# Patient Record
Sex: FEMALE | Race: WHITE
[De-identification: names, ages, dates, MRNs, and addresses within clinical notes are randomized per-mention and may not be internally consistent; named-entity substitution may affect disease eponyms.]

---

## 2019-12-06 ENCOUNTER — HOSPITAL ENCOUNTER (EMERGENCY)
Dept: HOSPITAL 96 - M.ERS | Age: 19
Discharge: HOME | End: 2019-12-06
Payer: OTHER GOVERNMENT

## 2019-12-06 VITALS — WEIGHT: 217.99 LBS | HEIGHT: 60 IN | BODY MASS INDEX: 42.8 KG/M2

## 2019-12-06 VITALS — DIASTOLIC BLOOD PRESSURE: 66 MMHG | SYSTOLIC BLOOD PRESSURE: 134 MMHG

## 2019-12-06 DIAGNOSIS — Z98.890: ICD-10-CM

## 2019-12-06 DIAGNOSIS — J02.9: Primary | ICD-10-CM

## 2019-12-06 DIAGNOSIS — Z88.8: ICD-10-CM

## 2020-02-13 LAB
ADD MANUAL DIFF: NO
APPEARANCE UR: (no result)
APTT PPP: YELLOW S
BARBITURATES UR QL SCN: NEGATIVE
BASOPHILS # BLD AUTO: 0 10^3/UL (ref 0–0.2)
BASOPHILS NFR BLD AUTO: 0.2 % (ref 0–2)
BILIRUB UR QL STRIP: NEGATIVE
EOSINOPHIL # BLD AUTO: 0.1 10^3/UL (ref 0–0.6)
EOSINOPHIL NFR BLD AUTO: 0.9 % (ref 0–6)
ERYTHROCYTE [DISTWIDTH] IN BLOOD BY AUTOMATED COUNT: 15.5 % (ref 11.5–14)
GLUCOSE UR STRIP-MCNC: NEGATIVE MG/DL
HCT VFR BLD CALC: 31.4 % (ref 36–47)
HGB BLD-MCNC: 10.1 G/DL (ref 12–15.5)
KETONES UR STRIP-MCNC: 20 MG/DL
LYMPHOCYTES # BLD AUTO: 2.1 10^3/UL (ref 0.5–4.7)
LYMPHOCYTES NFR BLD AUTO: 19.2 % (ref 13–45)
MCH RBC QN AUTO: 22.7 PG (ref 27–33.4)
MCHC RBC AUTO-ENTMCNC: 32.2 G/DL (ref 32–36)
MCV RBC AUTO: 70 FL (ref 80–97)
METHADONE UR QL SCN: NEGATIVE
MONOCYTES # BLD AUTO: 0.9 10^3/UL (ref 0.1–1.4)
MONOCYTES NFR BLD AUTO: 7.9 % (ref 3–13)
NEUTROPHILS # BLD AUTO: 7.9 10^3/UL (ref 1.7–8.2)
NEUTS SEG NFR BLD AUTO: 71.8 % (ref 42–78)
NITRITE UR QL STRIP: NEGATIVE
PCP UR QL SCN: NEGATIVE
PH UR STRIP: 7 [PH] (ref 5–9)
PLATELET # BLD: 169 10^3/UL (ref 150–450)
PROT UR STRIP-MCNC: NEGATIVE MG/DL
RBC # BLD AUTO: 4.46 10^6/UL (ref 3.72–5.28)
SP GR UR STRIP: 1.02
TOTAL CELLS COUNTED % (AUTO): 100 %
URINE AMPHETAMINES SCREEN: NEGATIVE
URINE BENZODIAZEPINES SCREEN: NEGATIVE
URINE COCAINE SCREEN: NEGATIVE
URINE MARIJUANA (THC) SCREEN: NEGATIVE
UROBILINOGEN UR-MCNC: NEGATIVE MG/DL (ref ?–2)
WBC # BLD AUTO: 11.1 10^3/UL (ref 4–10.5)

## 2020-02-17 ENCOUNTER — HOSPITAL ENCOUNTER (INPATIENT)
Dept: HOSPITAL 62 - 2S | Age: 20
LOS: 3 days | Discharge: HOME | End: 2020-02-20
Attending: OBSTETRICS & GYNECOLOGY | Admitting: OBSTETRICS & GYNECOLOGY
Payer: OTHER GOVERNMENT

## 2020-02-17 DIAGNOSIS — Z23: ICD-10-CM

## 2020-02-17 DIAGNOSIS — G43.909: ICD-10-CM

## 2020-02-17 DIAGNOSIS — O34.219: Primary | ICD-10-CM

## 2020-02-17 DIAGNOSIS — O34.211: ICD-10-CM

## 2020-02-17 DIAGNOSIS — Z3A.37: ICD-10-CM

## 2020-02-17 DIAGNOSIS — D62: ICD-10-CM

## 2020-02-17 PROCEDURE — 86900 BLOOD TYPING SEROLOGIC ABO: CPT

## 2020-02-17 PROCEDURE — 94760 N-INVAS EAR/PLS OXIMETRY 1: CPT

## 2020-02-17 PROCEDURE — 94799 UNLISTED PULMONARY SVC/PX: CPT

## 2020-02-17 PROCEDURE — 90686 IIV4 VACC NO PRSV 0.5 ML IM: CPT

## 2020-02-17 PROCEDURE — 85027 COMPLETE CBC AUTOMATED: CPT

## 2020-02-17 PROCEDURE — 86850 RBC ANTIBODY SCREEN: CPT

## 2020-02-17 PROCEDURE — 85025 COMPLETE CBC W/AUTO DIFF WBC: CPT

## 2020-02-17 PROCEDURE — 59025 FETAL NON-STRESS TEST: CPT

## 2020-02-17 PROCEDURE — 86901 BLOOD TYPING SEROLOGIC RH(D): CPT

## 2020-02-17 PROCEDURE — 3E0234Z INTRODUCTION OF SERUM, TOXOID AND VACCINE INTO MUSCLE, PERCUTANEOUS APPROACH: ICD-10-PCS | Performed by: OBSTETRICS & GYNECOLOGY

## 2020-02-17 PROCEDURE — 36415 COLL VENOUS BLD VENIPUNCTURE: CPT

## 2020-02-17 PROCEDURE — 80307 DRUG TEST PRSMV CHEM ANLYZR: CPT

## 2020-02-17 PROCEDURE — 81001 URINALYSIS AUTO W/SCOPE: CPT

## 2020-02-17 RX ADMIN — OXYCODONE AND ACETAMINOPHEN PRN TAB: 5; 325 TABLET ORAL at 22:56

## 2020-02-17 RX ADMIN — KETOROLAC TROMETHAMINE SCH MG: 30 INJECTION, SOLUTION INTRAMUSCULAR at 17:45

## 2020-02-17 RX ADMIN — DOCUSATE SODIUM SCH MG: 100 CAPSULE, LIQUID FILLED ORAL at 17:45

## 2020-02-17 RX ADMIN — Medication SCH CAP: at 12:37

## 2020-02-17 RX ADMIN — OXYCODONE AND ACETAMINOPHEN PRN TAB: 5; 325 TABLET ORAL at 12:53

## 2020-02-17 RX ADMIN — DOCUSATE SODIUM SCH MG: 100 CAPSULE, LIQUID FILLED ORAL at 12:37

## 2020-02-17 RX ADMIN — DIVALPROEX SODIUM SCH MG: 500 TABLET, FILM COATED, EXTENDED RELEASE ORAL at 22:35

## 2020-02-17 NOTE — PDOC DELIVERY SUMMARY
Delivery Summary





- Maternal


Hx : II


Hx # Term Pregnancies: 1


JONATHAN: 20


Prenatal Risk Factors: Other


Ruptured Membranes: AROM


Fluids: Clear





- Delivery


Labor: Not In Labor


Presentation: Vertex


Fetal Heart Rate Monitoring: Done Pre-Operatively


Support Person Present: Yes


Location: OR


: Scheduled


Placenta: Within Normal Limits


Number of Vessels (Cord): 3


Delivery of Placenta Date: 20


Estimated Blood Loss: 900





- Medications


Type of Anesthesia:: Spinal

## 2020-02-17 NOTE — OPERATIVE REPORT
Operative Report


DATE OF SURGERY: 20


PREOPERATIVE DIAGNOSIS: IUP at term and IUGR


POSTOPERATIVE DIAGNOSIS: Same


OPERATION: Repeat low transverse  section delivery of viable infant


SURGEON: SAMSON LEAL


TISSUE REMOVED OR ALTERED: Placenta


ESTIMATED BLOOD LOSS: 900 cc


PROCEDURE: 





 The patient was taken to the operating room where spinal anesthesia was obtai

laz and found to be adequate.  She was then prepped and draped in the normal 

sterile fashion and placed in the dorsal supine position with a leftward tilt.  

A Pfannenstiel skin incision was then made and carried through to the underlying

layers of the fascia with the scalpel.  The fascia was incised in the midline 

and the incision extended laterally with the Rodriguez scissors.  The superior aspect

of the fascial incision was then grasped with Shant clamps elevated and the 

underlying rectus muscles dissected off bluntly.  Attention was then turned to 

the inferior aspect of the fascial incision which in a similar fashion was 

grasped, tented up with Kocher clamps, and the rectus muscles dissected off 

bluntly.  The rectus muscles were then  in the midline and the 

peritoneum at the amount identified and entered bluntly.  The peritoneal 

incision was then extended superiorly and inferiorly with good visualization of 

the bladder.  [The bladder blade was inserted and the vesicouterine peritoneum 

identified grasped with [place] pickups and entered sharply with the Metzenbaum 

scissors.  THE incision was then extended laterally with the Metzenbaum scissors

 and a bladder flap created digitally.  The bladder blade was then reinserted 

and the lower uterine segment incised in a transverse fashion with the scalpel. 

 The uterine incision was then extended bluntly.  The bladder blade was removed 

and the infant's head was delivered from cephalic presentation atraumatically.  

The nose and mouth were suctioned and the cord doubly clamped and cut.  And the 

infant was handed off to waiting pediatricians.


 


The placenta was then delivered manully and the uterus exteriorized and cleared 

of all clots and debris.  The uterine incision was then repaired with 1-0 Vicryl

 in a running locked fashion.  A second layer of the same suture was used to 

obtain hemostasis via imbrication of the initial layer.  Multiple 1-0 Vicryl 

sutures were used to control the bleeding and hemostasis was noted.  The uterus 

was returned to the patient's abdomen.  The gutters were cleared of all clots 

and debris.  All operative sites were noted to be hemostatic.  The fascia was 

reapproximated with 0 Vicryl in a running fashion from each lateral edge to the 

midline.  The patient tolerated the procedure well.  Sponge lap needle and 

instrument counts are correct -2. 2 g of Ancef were given prior to skin incisio

n.  The patient was taken to the recovery area awake and in stable condition.

## 2020-02-18 LAB
ERYTHROCYTE [DISTWIDTH] IN BLOOD BY AUTOMATED COUNT: 16 % (ref 11.5–14)
HCT VFR BLD CALC: 25.3 % (ref 36–47)
HGB BLD-MCNC: 8.3 G/DL (ref 12–15.5)
MCH RBC QN AUTO: 23.7 PG (ref 27–33.4)
MCHC RBC AUTO-ENTMCNC: 32.8 G/DL (ref 32–36)
MCV RBC AUTO: 72 FL (ref 80–97)
PLATELET # BLD: 141 10^3/UL (ref 150–450)
RBC # BLD AUTO: 3.5 10^6/UL (ref 3.72–5.28)
WBC # BLD AUTO: 11 10^3/UL (ref 4–10.5)

## 2020-02-18 RX ADMIN — KETOROLAC TROMETHAMINE SCH MG: 30 INJECTION, SOLUTION INTRAMUSCULAR at 00:19

## 2020-02-18 RX ADMIN — DOCUSATE SODIUM SCH MG: 100 CAPSULE, LIQUID FILLED ORAL at 09:03

## 2020-02-18 RX ADMIN — DIVALPROEX SODIUM SCH MG: 500 TABLET, FILM COATED, EXTENDED RELEASE ORAL at 09:02

## 2020-02-18 RX ADMIN — DIVALPROEX SODIUM SCH MG: 500 TABLET, FILM COATED, EXTENDED RELEASE ORAL at 21:17

## 2020-02-18 RX ADMIN — IBUPROFEN SCH MG: 800 TABLET, FILM COATED ORAL at 14:10

## 2020-02-18 RX ADMIN — IBUPROFEN SCH MG: 800 TABLET, FILM COATED ORAL at 09:03

## 2020-02-18 RX ADMIN — DOCUSATE SODIUM SCH MG: 100 CAPSULE, LIQUID FILLED ORAL at 18:07

## 2020-02-18 RX ADMIN — OXYCODONE AND ACETAMINOPHEN PRN TAB: 5; 325 TABLET ORAL at 09:20

## 2020-02-18 RX ADMIN — Medication SCH CAP: at 09:03

## 2020-02-18 RX ADMIN — IBUPROFEN SCH MG: 800 TABLET, FILM COATED ORAL at 20:27

## 2020-02-18 NOTE — PDOC PROGRESS REPORT
Subjective-OB


Progress Note for:: 20


Subjective: 


Pt doing well, has been out of bed to void without difficulty. She reports good 

pain control, reg diet and light bleeding.  





Physical Exam (OB)


Vital Signs: 


                                        











Temp Pulse Resp BP Pulse Ox


 


 97.8 F   75   16   104/53 L  99 


 


 20 07:42  20 07:42  20 07:42  20 07:42  20 07:42








                                 Intake & Output











 20





 06:59 06:59 06:59


 


Intake Total  880 400


 


Output Total  1950 


 


Balance  -1070 400


 


Weight 102.1 kg  














- PIH/Pre-Eclampsia


Headache: Absent





- 


Dressing Removed: No


Incision: Open


Closure Type: Sutures





- Lochia


Lochia Amount: Small 10-25 ml


Lochia Color: Rubra/Red





- Abdomen


Description: Soft, Round


Hernia Present: No


Fundal Description: Firm, Midline


Fundal Height: u/u - u/2





Objective-Diagnostic


Laboratory: 


                                        





                                 20 06:07 





                                        











  20





  06:07


 


WBC  11.0 H


 


RBC  3.50 L


 


Hgb  8.3 L


 


Hct  25.3 L


 


MCV  72 L


 


MCH  23.7 L


 


MCHC  32.8


 


RDW  16.0 H


 


Plt Count  141 L














Assessment and Plan(PN)





- Assessment and Plan


(1) Status post repeat low transverse  section


Is this a current diagnosis for this admission?: Yes   





- Time Spent with Patient


Time with patient: Less than 15 minutes


Medications reviewed and adjusted accordingly: Yes





- Disposition


Anticipated Discharge: Home


Within: within 24 hours

## 2020-02-19 RX ADMIN — Medication SCH CAP: at 09:19

## 2020-02-19 RX ADMIN — DIVALPROEX SODIUM SCH MG: 500 TABLET, FILM COATED, EXTENDED RELEASE ORAL at 21:34

## 2020-02-19 RX ADMIN — DIVALPROEX SODIUM SCH MG: 500 TABLET, FILM COATED, EXTENDED RELEASE ORAL at 09:19

## 2020-02-19 RX ADMIN — IBUPROFEN SCH MG: 800 TABLET, FILM COATED ORAL at 14:07

## 2020-02-19 RX ADMIN — IBUPROFEN SCH MG: 800 TABLET, FILM COATED ORAL at 02:56

## 2020-02-19 RX ADMIN — IBUPROFEN SCH MG: 800 TABLET, FILM COATED ORAL at 21:33

## 2020-02-19 RX ADMIN — IBUPROFEN SCH MG: 800 TABLET, FILM COATED ORAL at 09:19

## 2020-02-19 RX ADMIN — DOCUSATE SODIUM SCH MG: 100 CAPSULE, LIQUID FILLED ORAL at 09:19

## 2020-02-19 RX ADMIN — DOCUSATE SODIUM SCH MG: 100 CAPSULE, LIQUID FILLED ORAL at 17:34

## 2020-02-19 RX ADMIN — OXYCODONE AND ACETAMINOPHEN PRN TAB: 5; 325 TABLET ORAL at 04:10

## 2020-02-19 NOTE — PDOC PROGRESS REPORT
Subjective-OB


Progress Note for:: 20


Subjective: 


18yo G2 now P2 s/p repeat . Ambulating, voiding and passing gas without 

difficulty. Pain well tolerated with medication. Having some difficulty with 

breastfeeding but supplementing via SNS system. Baby not being discharged due to

weight loss, requesting to stay another day. No other concerns. 





Physical Exam (OB)


Vital Signs: 


                                        











Temp Pulse Resp BP Pulse Ox


 


 98.0 F   87   18   106/58 L  99 


 


 20 07:16  20 07:16  20 07:16  20 07:16  20 07:16








                                 Intake & Output











 20





 06:59 06:59 06:59


 


Intake Total 880 1580 


 


Output Total 1950  


 


Balance -1070 1580 














- General


General Appearance: Appears well


In distress: None





- PIH/Pre-Eclampsia


DTR's: 1 +


Clonus: Negative


Headache: Absent


Epigastric Pain: No


Visual Changes: No





- 


Dressing Removed: Yes


Incision: Well Approximated


Closure Type: internal





- Lochia


Lochia Amount: Scant < 10 ml


Lochia Color: Rubra/Red





- Abdomen


Description: Tender, Soft


Hernia Present: No


Fundal Description: Firm


Fundal Height: u/u - u/2





- Respiratory


Respiratory Status: No respiratory distress





- Extremities


Upper extremity: Normal inspection


Lower extremities: Normal inspection





- Neurological


Cognition: Normal


Orientation: AAOx4





- Psychological


Associated symptoms: Normal affect, Normal mood





Objective-Diagnostic


Laboratory: 


                                        





                                 20 06:07 











Assessment and Plan(PN)





- Assessment and Plan


(1) Acute blood loss anemia


Is this a current diagnosis for this admission?: Yes   


Plan: 


increase dietary iron and FeSO4 bid. Had started supplementation one week prior 

to delivery. 








(2) Anemia affecting pregnancy


Qualifiers: 


   Trimester: third trimester   Qualified Code(s): O99.013 - Anemia complicating

pregnancy, third trimester   


Is this a current diagnosis for this admission?: Yes   


Plan: 


 Had started supplementation one week prior to delivery, continue FeSO4 BID








(3) Status post repeat low transverse  section


Is this a current diagnosis for this admission?: Yes   


Plan: 


routine pp care, anticipate discharge tomorrow








- Time Spent with Patient


Time with patient: Less than 15 minutes


Medications reviewed and adjusted accordingly: Yes





- Disposition


Anticipated Discharge: Home


Within: within 24 hours

## 2020-02-20 VITALS — DIASTOLIC BLOOD PRESSURE: 49 MMHG | SYSTOLIC BLOOD PRESSURE: 114 MMHG

## 2020-02-20 RX ADMIN — DIVALPROEX SODIUM SCH MG: 500 TABLET, FILM COATED, EXTENDED RELEASE ORAL at 09:25

## 2020-02-20 RX ADMIN — Medication SCH CAP: at 09:25

## 2020-02-20 RX ADMIN — IBUPROFEN SCH MG: 800 TABLET, FILM COATED ORAL at 09:25

## 2020-02-20 RX ADMIN — DOCUSATE SODIUM SCH MG: 100 CAPSULE, LIQUID FILLED ORAL at 09:25

## 2020-02-20 RX ADMIN — IBUPROFEN SCH MG: 800 TABLET, FILM COATED ORAL at 02:28

## 2020-02-20 NOTE — PDOC PROGRESS REPORT
Subjective-OB


Progress Note for:: 20


Subjective: 





Ready for discharge.








Physical Exam (OB)


Vital Signs: 


                                        











Temp Pulse Resp BP Pulse Ox


 


 97.9 F   77   16   113/57 L  100 


 


 20 07:40  20 07:40  20 07:40  20 07:40  20 07:40








                                 Intake & Output











 20





 06:59 06:59 06:59


 


Intake Total 1580  


 


Balance 1580  














- PIH/Pre-Eclampsia


DTR's: 1 +


Clonus: Negative


Headache: Absent


Epigastric Pain: No


Visual Changes: No





- 


Dressing Removed: Yes


Incision: Well Approximated


Closure Type: Surgical Glue





- Lochia


Lochia Amount: Scant < 10 ml


Lochia Color: Rubra/Red





- Abdomen


Description: Tender, Soft


Hernia Present: No


Bowel Sounds: Normoactive


Flatus Presence: Present


Stool: No


Fundal Description: Firm, Midline


Fundal Height: u/u - u/2





Objective-Diagnostic


Laboratory: 


                                        





                                 20 06:07 











Assessment and Plan(PN)





- Time Spent with Patient


Medications reviewed and adjusted accordingly: Yes





- Disposition


Anticipated Discharge: Home

## 2020-02-20 NOTE — PDOC DISCHARGE SUMMARY
Impression





- Admit/DC Date/PCP


Admission Date/Primary Care Provider: 


  20 04:34





  SAMSON LEAL MD





Discharge Date: 20





- Discharge Diagnosis


(1) Acute blood loss anemia


Is this a current diagnosis for this admission?: Yes   





(2) Anemia affecting pregnancy


Is this a current diagnosis for this admission?: Yes   








(4) Status post repeat low transverse  section


Is this a current diagnosis for this admission?: Yes   





- Assessment


Summary: 


repeat  postpartum day 2, stable and ready for discharge





- Additional Information


Resuscitation Status: Full Code


Discharge Diet: As Tolerated, Regular


Discharge Activity: Activity As Tolerated, Balance Activity w/Rest, No Driving, 

No Lifting Over 10 Pounds, No Lifting/Push/Pulling, Non-Ambulatory Child, Pelvic

Rest, Slowly Increase Activity, No tub bath, Walk Frequently


Referrals: 


[facility] [Provider Group]


Prescriptions: 


Oxycodone HCl/Acetaminophen [Percocet 5-325 mg Tablet] 2 tab PO Q4HP PRN #20 

tablet


 PRN Reason: 


Ibuprofen [Motrin 800 mg Tablet] 800 mg PO Q6A #30 tablet


Home Medications: 








Divalproex Sodium [Depakote  mg Tab.sr] 1 tab PO Q12 20 


Ferrous Sulfate [Feosol 325 mg Tablet] 1 tab PO DAILY 20 


Pnv No.95/Ferrous Fum/Folic AC [Prenatal Caplet] 1 tab PO DAILY 20 


Ibuprofen [Motrin 800 mg Tablet] 800 mg PO Q6A #30 tablet 20 


Oxycodone HCl/Acetaminophen [Percocet 5-325 mg Tablet] 2 tab PO Q4HP PRN #20 

tablet 20 











HPI


Gestational Age: 37 wks


Reason(s) for Admission: Ceasarean Section-Repeat


Prenatal Procedures: Ultrasound


Intrapartum Procedure(s): : Low Cervical, Transverse





Results


Laboratory Results: 


                                        











WBC  11.0 10^3/uL (4.0-10.5)  H  20  06:07    


 


RBC  3.50 10^6/uL (3.72-5.28)  L  20  06:07    


 


Hgb  8.3 g/dL (12.0-15.5)  L  20  06:07    


 


Hct  25.3 % (36.0-47.0)  L  20  06:07    


 


MCV  72 fl (80-97)  L  20  06:07    


 


MCH  23.7 pg (27.0-33.4)  L  20  06:07    


 


MCHC  32.8 g/dL (32.0-36.0)   20  06:07    


 


RDW  16.0 % (11.5-14.0)  H  20  06:07    


 


Plt Count  141 10^3/uL (150-450)  L  20  06:07    


 


Lymph % (Auto)  19.2 % (13-45)   20  09:20    


 


Mono % (Auto)  7.9 % (3-13)   20  09:20    


 


Eos % (Auto)  0.9 % (0-6)   20  09:20    


 


Baso % (Auto)  0.2 % (0-2)   20  09:20    


 


Absolute Neuts (auto)  7.9 10^3/uL (1.7-8.2)   20  09:20    


 


Absolute Lymphs (auto)  2.1 10^3/uL (0.5-4.7)   20  09:20    


 


Absolute Monos (auto)  0.9 10^3/uL (0.1-1.4)   20  09:20    


 


Absolute Eos (auto)  0.1 10^3/uL (0.0-0.6)   20  09:20    


 


Absolute Basos (auto)  0.0 10^3/uL (0.0-0.2)   20  09:20    


 


Seg Neutrophils %  71.8 % (42-78)   20  09:20    


 


Urine Color  YELLOW   20  09:20    


 


Urine Appearance  CLOUDY   20  09:20    


 


Urine pH  7.0  (5.0-9.0)   20  09:20    


 


Ur Specific Gravity  1.017   20  09:20    


 


Urine Protein  NEGATIVE mg/dL (NEGATIVE)   20  09:20    


 


Urine Glucose (UA)  NEGATIVE mg/dL (NEGATIVE)   20  09:20    


 


Urine Ketones  20 mg/dL (NEGATIVE)  H  20  09:20    


 


Urine Blood  NEGATIVE  (NEGATIVE)   20  09:20    


 


Urine Nitrite  NEGATIVE  (NEGATIVE)   20  09:20    


 


Urine Bilirubin  NEGATIVE  (NEGATIVE)   20  09:20    


 


Urine Urobilinogen  NEGATIVE mg/dL (<2.0)   20  09:20    


 


Ur Leukocyte Esterase  LARGE  (NEGATIVE)  H  20  09:20    


 


Urine WBC (Auto)  11 /HPF  20  09:20    


 


Urine RBC (Auto)  1 /HPF  20  09:20    


 


U Hyaline Cast (Auto)  5 /LPF  20  09:20    


 


Urine Bacteria (Auto)  1+ /HPF  20  09:20    


 


Squamous Epi Cells Auto  22 /HPF  20  09:20    


 


U Non-Squamous Epis Auto  <1 /HPF  20  09:20    


 


Urine Mucus (Auto)  RARE /LPF  20  09:20    


 


Urine Ascorbic Acid  NEGATIVE  (NEGATIVE)   20  09:20    


 


Urine Opiates Screen  NEGATIVE   20  09:20    


 


Urine Methadone Screen  NEGATIVE   20  09:20    


 


Ur Barbiturates Screen  NEGATIVE   20  09:20    


 


Ur Phencyclidine Scrn  NEGATIVE   20  09:20    


 


Ur Amphetamines Screen  NEGATIVE   20  09:20    


 


U Benzodiazepines Scrn  NEGATIVE   20  09:20    


 


Urine Cocaine Screen  NEGATIVE   20  09:20    


 


U Marijuana (THC) Screen  NEGATIVE   20  09:20    


 


Blood Type  B POSITIVE   20  10:15    


 


Antibody Screen  NEGATIVE   20  10:15    














Plan


Plan of Treatment: 


Follow up at [facility] in 1 wk.


Time Spent: Less than 30 Minutes